# Patient Record
Sex: MALE | Race: WHITE | URBAN - METROPOLITAN AREA
[De-identification: names, ages, dates, MRNs, and addresses within clinical notes are randomized per-mention and may not be internally consistent; named-entity substitution may affect disease eponyms.]

---

## 2017-04-11 ENCOUNTER — HOSPITAL ENCOUNTER (OUTPATIENT)
Dept: OTHER | Age: 82
Discharge: HOME | End: 2017-04-11
Attending: INTERNAL MEDICINE | Admitting: INTERNAL MEDICINE

## 2017-04-11 DIAGNOSIS — R06.81 BREATHLESSNESS ON EXERTION: ICD-10-CM

## 2017-12-18 ENCOUNTER — OFFICE VISIT (OUTPATIENT)
Dept: INTERNAL MEDICINE | Age: 82
End: 2017-12-18

## 2017-12-18 ENCOUNTER — HOSPITAL ENCOUNTER (OUTPATIENT)
Dept: OTHER | Age: 82
Discharge: HOME | End: 2017-12-18
Attending: INTERNAL MEDICINE | Admitting: INTERNAL MEDICINE

## 2017-12-18 VITALS
WEIGHT: 150 LBS | BODY MASS INDEX: 20.32 KG/M2 | RESPIRATION RATE: 12 BRPM | TEMPERATURE: 98 F | HEART RATE: 70 BPM | SYSTOLIC BLOOD PRESSURE: 110 MMHG | OXYGEN SATURATION: 96 % | HEIGHT: 72 IN | DIASTOLIC BLOOD PRESSURE: 70 MMHG

## 2017-12-18 DIAGNOSIS — J40 BRONCHITIS: Primary | ICD-10-CM

## 2017-12-18 DIAGNOSIS — J40 BRONCHITIS: ICD-10-CM

## 2017-12-18 PROCEDURE — G8420 CALC BMI NORM PARAMETERS: HCPCS | Performed by: INTERNAL MEDICINE

## 2017-12-18 PROCEDURE — G8427 DOCREV CUR MEDS BY ELIG CLIN: HCPCS | Performed by: INTERNAL MEDICINE

## 2017-12-18 PROCEDURE — G8484 FLU IMMUNIZE NO ADMIN: HCPCS | Performed by: INTERNAL MEDICINE

## 2017-12-18 PROCEDURE — 4040F PNEUMOC VAC/ADMIN/RCVD: CPT | Performed by: INTERNAL MEDICINE

## 2017-12-18 PROCEDURE — 99213 OFFICE O/P EST LOW 20 MIN: CPT | Performed by: INTERNAL MEDICINE

## 2017-12-18 PROCEDURE — 1036F TOBACCO NON-USER: CPT | Performed by: INTERNAL MEDICINE

## 2017-12-18 PROCEDURE — 1123F ACP DISCUSS/DSCN MKR DOCD: CPT | Performed by: INTERNAL MEDICINE

## 2017-12-18 ASSESSMENT — PATIENT HEALTH QUESTIONNAIRE - PHQ9
SUM OF ALL RESPONSES TO PHQ QUESTIONS 1-9: 0
2. FEELING DOWN, DEPRESSED OR HOPELESS: 0
1. LITTLE INTEREST OR PLEASURE IN DOING THINGS: 0
SUM OF ALL RESPONSES TO PHQ9 QUESTIONS 1 & 2: 0

## 2017-12-18 NOTE — PROGRESS NOTES
Chief Complaint   Patient presents with    Other     bronchitis        HPI:  A patient of Dr. Taqueria Cole. A one week history of a cold with a cough that is not productive. No fever or shaking chills. He was seen in the [facility] ER where he had normal blood work and a normal chest x-ray. He was sent home on a Z-pack but his daughter states he still has weakness, fatigue, and malaise. He was told to take cough medicine and Tylenol for aches and pains which he did not do. Social History     Social History    Marital status:      Spouse name: N/A    Number of children: N/A    Years of education: N/A     Occupational History    Not on file.      Social History Main Topics    Smoking status: Former Smoker     Packs/day: 0.00     Years: 77.00     Types: Pipe    Smokeless tobacco: Former User     Quit date: 1/20/1961      Comment: former cigarette cigar smoker/smokes pipe only for last 10 years, quit in 2016    Alcohol use Yes      Comment: very rare    Drug use: No    Sexual activity: Not on file     Other Topics Concern    Not on file     Social History Narrative    No narrative on file     Patient Active Problem List   Diagnosis    [place] (hard of hearing)    Dysphagia     Past Medical History:   Diagnosis Date    Atrial fibrillation ([address]     Cancer ([address]     skin cancer    CHF (congestive heart failure) ([address]     Choledocholithiasis with obstruction jan 2011    Colon polyps     Hypercholesteremia     Macular degeneration     Melanoma in situ of back ([facility] [address] 2001     Past Surgical History:   Procedure Laterality Date    ERCP  jan 2011     with sphincterotomy (Dr Ambar Kaur)   [address]      SKIN BIOPSY       Current Outpatient Prescriptions   Medication Sig Dispense Refill    azithromycin (ZITHROMAX) 250 MG tablet Take 2 tablets (500 mg) on Day 1, followed by 1 tablet (250 mg) once daily on Days 2 through 5. 1 packet 0    omeprazole (PRILOSEC) 20 MG capsule Take 20 mg by mouth daily.  lovastatin (ALTOPREV) 20 MG ER tablet Take 20 mg by mouth daily. No current facility-administered medications for this visit. No Known Allergies    Physical Exam:   /70 (Site: Left Arm, Position: Sitting, Cuff Size: Medium Adult)   Pulse 70   Temp 98 °F (36.7 °C)   Resp 12   Ht 6' (1.829 m)   Wt 150 lb (68 kg)   SpO2 96%   BMI 20.34 kg/m²   General appearance: alert, appears stated age and cooperative  Lungs: ronchi and wheezing in the left base posteriorly.   Heart: atrial fibrillation with a controlled ventricular response  Extremities: extremities normal, atraumatic, no cyanosis or edema  Other: N/A    Lab Review: not applicable      Assessment: Cough    Plan:               Repeat chest x-ray     ROSA MARIA Ireland MD

## 2017-12-28 ENCOUNTER — OFFICE VISIT (OUTPATIENT)
Dept: ORTHOPEDIC SURGERY | Age: 82
End: 2017-12-28

## 2017-12-28 VITALS
HEIGHT: 72 IN | SYSTOLIC BLOOD PRESSURE: 129 MMHG | HEART RATE: 86 BPM | WEIGHT: 150 LBS | BODY MASS INDEX: 20.32 KG/M2 | DIASTOLIC BLOOD PRESSURE: 82 MMHG

## 2017-12-28 DIAGNOSIS — M75.101 ROTATOR CUFF TEAR ARTHROPATHY OF RIGHT SHOULDER: Primary | ICD-10-CM

## 2017-12-28 DIAGNOSIS — M25.511 RIGHT SHOULDER PAIN, UNSPECIFIED CHRONICITY: ICD-10-CM

## 2017-12-28 DIAGNOSIS — M12.811 ROTATOR CUFF TEAR ARTHROPATHY OF RIGHT SHOULDER: Primary | ICD-10-CM

## 2017-12-28 PROCEDURE — G8420 CALC BMI NORM PARAMETERS: HCPCS | Performed by: ORTHOPAEDIC SURGERY

## 2017-12-28 PROCEDURE — 4040F PNEUMOC VAC/ADMIN/RCVD: CPT | Performed by: ORTHOPAEDIC SURGERY

## 2017-12-28 PROCEDURE — 20610 DRAIN/INJ JOINT/BURSA W/O US: CPT | Performed by: ORTHOPAEDIC SURGERY

## 2017-12-28 PROCEDURE — G8484 FLU IMMUNIZE NO ADMIN: HCPCS | Performed by: ORTHOPAEDIC SURGERY

## 2017-12-28 PROCEDURE — 99203 OFFICE O/P NEW LOW 30 MIN: CPT | Performed by: ORTHOPAEDIC SURGERY

## 2017-12-28 PROCEDURE — G8427 DOCREV CUR MEDS BY ELIG CLIN: HCPCS | Performed by: ORTHOPAEDIC SURGERY

## 2017-12-28 RX ORDER — FUROSEMIDE 20 MG/1
40 TABLET ORAL DAILY
Status: ON HOLD | COMMUNITY
End: 2018-03-24 | Stop reason: CLARIF

## 2017-12-28 NOTE — PROGRESS NOTES
Pal Borrego  [id number]  December 28, 2017    Chief Complaint   Patient presents with    Shoulder Pain     Right shoulder pain for 3wks. Pain score 8/10           History: The patient is a 70-year-old male here today for evaluation regarding his right shoulder pain. Reports an insidious onset of shoulder pain approximately 3 weeks ago with associated weakness. Again, the patient denies any injuries whatsoever. He denies prior issues with the shoulder earlier than 3 weeks ago. He denies associated numbness or tingling. He has been taking Tylenol intermittently with mild relief his pain. He did reportedly receive some sort of injection in the shoulder yesterday via his PCP. He is a right-hand-dominant retired individual.  He presents today with his 2 adult children. He rates his right shoulder pain 8/10. This is a consult for right shoulder pain from Dr. Raul Hernandez. The patient's  past medical history, medications, allergies,  family history, social history, and review of systems have been reviewed, and dated and are recorded in the chart. /82   Pulse 86   Ht 6' (1.829 m)   Wt 150 lb (68 kg)   BMI 20.34 kg/m²     Physical: The patient presents today in no acute distress. He is alert and oriented to person, place and time. He displays appropriate mood and affect. He is well dressed, nourished and  groomed. He has a normal affect. Examination of the neck reveals no evidence of tenderness. Spurling's sign is negative. Active range of motion of the right shoulder is: 35 degrees abduction, 45 degrees forward flexion, 25 degrees of external rotation and internal rotation to right greater trochanter. Passive range of motion of the right shoulder is: 90 degrees abduction, 120 degrees forward flexion, 40 degrees of external rotation and internal rotation to L5 (all limited by pain). Active and passive range of motion of the opposite shoulder is full.  Examination of the right shoulder reveals positive Neer and Mariscal' impingement signs. There is mild subacromial crepitus with range of motion. Drop arm test is positive on the right . Speed's test is negative. There is no evidence of tenderness over the Bicipital groove. He  does have tenderness over the [facility] joint. Cross body adduction test is positive. He has moderate tenderness over the subacromial space. There is no evidence of scapular winging. Lift off sign is negative. There is diffuse periscapular atrophy. External rotation strength is 2/5. There are no skin lesions, cellulitis, or extreme edema in the upper extremities. Sensation is intact to axillary, median, radial, and ulnar nerves bilaterally. The patient has warm and well-perfused bilateral upper extremities with brisk capillary refill. Radial and ulnar pulses are palpable and 2+ bilaterally. X-rays: 4 views of the right shoulder pain in the office today were extensively reviewed. There is evidence of moderate acromioclavicular arthritis. There is no evidence of acute fracture or foreign body. There is mild glenohumeral arthritis. There is evidence of a type 1-2 acromion. Impression: 1) right shoulder rotator cuff arthropathy    Plan: At this time we will inject the right shoulder under sterile conditions. After a Betadine prep of the shoulder, 3cc of 0.25% Marcaine and 2cc of 40 mg Depo-Medrol were injected into the shoulder. The patient tolerated the injection rather well. The patient was instructed to follow up immediately for any signs of infection. The patient will follow up with me in 4 week(s). The treatment plan was discussed in detail with the patient and includes activity modification and avoidance of repetitive overhead activities. Home exercises were explained to the patient. A formal therapy program was discussed with the patient and was given. If the patient continues to have severe pain and weakness, we will consider other options.   Given his age, he would be a very poor candidate for any rotator cuff repair. The most definitive surgical intervention would be a reverse total shoulder arthroplasty. We will arrange for home physical and occupational therapy.

## 2018-01-04 ENCOUNTER — TELEPHONE (OUTPATIENT)
Dept: ORTHOPEDIC SURGERY | Age: 83
End: 2018-01-04

## 2018-01-31 ENCOUNTER — TELEPHONE (OUTPATIENT)
Dept: ORTHOPEDIC SURGERY | Age: 83
End: 2018-01-31

## 2018-01-31 DIAGNOSIS — M75.101 ROTATOR CUFF TEAR ARTHROPATHY OF RIGHT SHOULDER: Primary | ICD-10-CM

## 2018-01-31 DIAGNOSIS — M12.811 ROTATOR CUFF TEAR ARTHROPATHY OF RIGHT SHOULDER: Primary | ICD-10-CM

## 2018-03-22 ENCOUNTER — TELEPHONE (OUTPATIENT)
Dept: ORTHOPEDIC SURGERY | Age: 83
End: 2018-03-22

## 2018-03-22 DIAGNOSIS — M75.101 ROTATOR CUFF TEAR ARTHROPATHY OF RIGHT SHOULDER: Primary | ICD-10-CM

## 2018-03-22 DIAGNOSIS — M12.811 ROTATOR CUFF TEAR ARTHROPATHY OF RIGHT SHOULDER: Primary | ICD-10-CM

## 2018-03-24 PROBLEM — R55 SYNCOPE, CARDIOGENIC: Status: ACTIVE | Noted: 2018-03-24

## 2018-03-26 PROBLEM — R53.1 WEAKNESS: Status: ACTIVE | Noted: 2018-03-24

## 2018-03-26 PROBLEM — E43 SEVERE PROTEIN-CALORIE MALNUTRITION (HCC): Status: ACTIVE | Noted: 2018-03-26

## 2018-03-28 PROBLEM — M75.101 TEAR OF RIGHT ROTATOR CUFF: Status: ACTIVE | Noted: 2018-03-28

## 2018-03-28 PROBLEM — I50.30 DIASTOLIC CHF WITH PRESERVED LEFT VENTRICULAR FUNCTION, NYHA CLASS 2 (HCC): Status: ACTIVE | Noted: 2018-03-28

## 2018-03-28 PROBLEM — I48.19 PERSISTENT ATRIAL FIBRILLATION (HCC): Status: ACTIVE | Noted: 2018-03-28

## 2018-03-31 PROBLEM — R06.03 RESPIRATORY DISTRESS: Status: ACTIVE | Noted: 2018-03-30

## 2018-03-31 PROBLEM — R65.10 SIRS (SYSTEMIC INFLAMMATORY RESPONSE SYNDROME) (HCC): Status: ACTIVE | Noted: 2018-03-31

## 2018-04-02 PROBLEM — R41.0 DELIRIUM: Status: ACTIVE | Noted: 2018-04-02

## 2018-04-02 PROBLEM — J81.1 PULMONARY EDEMA: Status: ACTIVE | Noted: 2018-03-30

## 2018-04-02 PROBLEM — J18.9 PNEUMONIA: Status: ACTIVE | Noted: 2018-03-30
